# Patient Record
Sex: MALE | Race: OTHER | NOT HISPANIC OR LATINO | URBAN - METROPOLITAN AREA
[De-identification: names, ages, dates, MRNs, and addresses within clinical notes are randomized per-mention and may not be internally consistent; named-entity substitution may affect disease eponyms.]

---

## 2024-07-25 ENCOUNTER — EMERGENCY (EMERGENCY)
Facility: HOSPITAL | Age: 39
LOS: 1 days | Discharge: HOME | End: 2024-07-25
Attending: EMERGENCY MEDICINE | Admitting: EMERGENCY MEDICINE
Payer: SELF-PAY

## 2024-07-25 VITALS
DIASTOLIC BLOOD PRESSURE: 90 MMHG | HEIGHT: 67 IN | HEART RATE: 87 BPM | OXYGEN SATURATION: 98 % | TEMPERATURE: 98 F | SYSTOLIC BLOOD PRESSURE: 170 MMHG | RESPIRATION RATE: 18 BRPM | WEIGHT: 227.96 LBS
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
## 2024-07-25 DIAGNOSIS — M25.562 PAIN IN LEFT KNEE: ICD-10-CM

## 2024-07-25 PROCEDURE — 73564 X-RAY EXAM KNEE 4 OR MORE: CPT | Mod: 26,LT

## 2024-07-25 PROCEDURE — 99283 EMERGENCY DEPT VISIT LOW MDM: CPT | Mod: 25

## 2024-07-25 PROCEDURE — 99284 EMERGENCY DEPT VISIT MOD MDM: CPT

## 2024-07-25 PROCEDURE — 73564 X-RAY EXAM KNEE 4 OR MORE: CPT

## 2024-07-25 RX ORDER — ACETAMINOPHEN 325 MG
650 TABLET ORAL ONCE
Refills: 0 | Status: COMPLETED | OUTPATIENT
Start: 2024-07-25 | End: 2024-07-25

## 2024-07-25 RX ADMIN — Medication 650 MILLIGRAM(S): at 22:15

## 2024-07-25 RX ADMIN — Medication 600 MILLIGRAM(S): at 22:15

## 2024-07-25 NOTE — ED PROVIDER NOTE - PROGRESS NOTE DETAILS
[place]: XR wnl. Pt has soft brace w/ him which he prefers over ace wrap.   Discussed importance of outpt follow up and return precautions. Clinically no indication for further emergent ED workup or hospitalization at this time. Stable for dc, outpt f/u.

## 2024-07-25 NOTE — ED PROVIDER NOTE - PATIENT PORTAL LINK FT
You can access the FollowMyHealth Patient Portal offered by [facility] by registering at the following website: [email or web address]. By joining [facility]’s FollowMyHealth portal, you will also be able to view your health information using other applications (apps) compatible with our system.

## 2024-07-25 NOTE — ED PROVIDER NOTE - NS ED ATTENDING STATEMENT MOD
Attending Only Zoryve Counseling:  I discussed with the patient that Zoryve is not for use in the eyes, mouth or vagina. The most commonly reported side effects include diarrhea, headache, insomnia, application site pain, upper respiratory tract infections, and urinary tract infections.  All of the patient's questions and concerns were addressed.

## 2024-07-25 NOTE — ED PROVIDER NOTE - NSFOLLOWUPINSTRUCTIONS_ED_ALL_ED_FT
Can take tylenol 650mg every 6hrs as needed for pain.    Can also take motrin 600mg every 6hrs as needed for pain (WARNING: Use may cause stomach issues/problems. Take with food. Prolonged use can also cause kidney issues.).     Follow up with orthopedist for persistent pain. Can call [phone] to schedule appointment.     Can use intermittent ice packs for 1st 24-48 hours.     Stay well hydrated.    Return for fevers, persistent vomit, uncontrolled pain, worsening breathing, focal weakness/numbness, worsening lightheaded.    Your doctor may need to make medication changes if your blood pressure continues to be high.  Follow up with primary doctor within 1-2 days.     Hypertension    Hypertension, commonly called high blood pressure, is when the force of blood pumping through your arteries is too strong. Hypertension forces your heart to work harder to pump blood. Your arteries may become narrow or stiff. Having untreated or uncontrolled hypertension for a long period of time can cause heart attack, stroke, kidney disease, and other problems. If started on a medication, take exactly as prescribed by your health care professional. Maintain a healthy lifestyle and follow up with your primary care physician.    SEEK IMMEDIATE MEDICAL CARE IF YOU HAVE ANY OF THE FOLLOWING SYMPTOMS: severe headache, confusion, chest pain, abdominal pain, vomiting, or shortness of breath.

## 2024-07-25 NOTE — ED PROVIDER NOTE - CLINICAL SUMMARY MEDICAL DECISION MAKING FREE TEXT BOX
39M PMH asthma, p/w pain. Pt states that he was at work (setting up for an event) when a rack of chairs fell over and landed on back of L ankle which then caused him to fall forward and hit L knee hard against ground. Occurred ~1330. Continued working but has worsening pain to L knee since then. Took diclofenac w/ minimal relief. No other systemic symptoms.   Hypertensive, other vitals wnl. Exam as above.   ddx: Likely benign soft tissue injury. Low suspicion but possible fx. Asymptomatic HTN.   XR, tylenol/motrin, likely outpt f/u. 39M PMH asthma, p/w pain. Pt states that he was at work (setting up for an event) when a rack of chairs fell over and landed on back of L ankle which then caused him to fall forward and hit L knee hard against ground. Occurred ~1330. Continued working but has worsening pain to L knee since then. Took diclofenac w/ minimal relief. No other systemic symptoms.   Hypertensive, other vitals wnl. Exam as above.   ddx: Likely benign soft tissue injury. Low suspicion but possible fx. Asymptomatic HTN. Very unlikely twisting injury/significant ligamentous injury. Knee is stable.   XR, tylenol/motrin, likely outpt f/u.

## 2024-07-25 NOTE — ED PROVIDER NOTE - PHYSICAL EXAMINATION
LLE: mild ttp to anterior/superior aspect of knee. Mild pain w/ ROM but able to range. minimal superficial skin abrasion. No other bony ttp.   normal dorsi/plantar flexion, sensation intact.   No ankle findings.   no LE edema, normal equal distal pulses, steady unassisted gait w/ limp.    Physical Exam:    CONSTITUTIONAL:  Generally well appearing, no acute distress, alert, awake and oriented  HEENT:  Moist mucous membranes, normal voice  PULM:  No accessory muscle use, speaking full sentences  SKIN:  Normal in appearance, normal color LLE: mild ttp to anterior/superior aspect of knee. Mild pain w/ ROM but able to range. minimal superficial skin abrasion. No other bony ttp.   normal dorsi/plantar flexion, sensation intact.   no joint warmth/erythema. FROM all joints. normal valgus/varus stress, negative anterior/posterior drawer test.   No ankle findings.   no LE edema, normal equal distal pulses, steady unassisted gait w/ limp.    Physical Exam:    CONSTITUTIONAL:  Generally well appearing, no acute distress, alert, awake and oriented  HEENT:  Moist mucous membranes, normal voice  PULM:  No accessory muscle use, speaking full sentences  SKIN:  Normal in appearance, normal color

## 2024-07-25 NOTE — ED PROVIDER NOTE - OBJECTIVE STATEMENT
39M PMH asthma, p/w pain. Pt states that he was at work (setting up for an event) when a rack of chairs fell over and landed on back of L ankle which then caused him to fall forward and hit L knee hard against ground. Occurred ~1330. Continued working but has worsening pain to L knee since then. Took diclofenac w/ minimal relief. No other systemic symptoms.   Denies other MSK pain, heel/ankle pain, weakness/numbness, other injuries. Not on AC.

## 2024-07-25 NOTE — ED ADULT NURSE NOTE - OBJECTIVE STATEMENT
39yM presents to the ER complaining of knee pain s/p fall. Pt states chairs fell on his ankle causing him to fall over and hit his left knee on the ground. Since has had worsening pain. Denies any numbness/tingling, headstrike, LOC, Cp/SOB.